# Patient Record
Sex: MALE | Race: WHITE | NOT HISPANIC OR LATINO | Employment: FULL TIME | URBAN - METROPOLITAN AREA
[De-identification: names, ages, dates, MRNs, and addresses within clinical notes are randomized per-mention and may not be internally consistent; named-entity substitution may affect disease eponyms.]

---

## 2023-01-13 ENCOUNTER — OFFICE VISIT (OUTPATIENT)
Dept: URGENT CARE | Facility: CLINIC | Age: 23
End: 2023-01-13
Payer: COMMERCIAL

## 2023-01-13 VITALS
DIASTOLIC BLOOD PRESSURE: 96 MMHG | RESPIRATION RATE: 16 BRPM | WEIGHT: 220 LBS | HEART RATE: 66 BPM | OXYGEN SATURATION: 98 % | TEMPERATURE: 97 F | HEIGHT: 70 IN | SYSTOLIC BLOOD PRESSURE: 160 MMHG | BODY MASS INDEX: 31.5 KG/M2

## 2023-01-13 DIAGNOSIS — Z20.2 EXPOSURE TO STD: Primary | ICD-10-CM

## 2023-01-13 PROCEDURE — 3080F PR MOST RECENT DIASTOLIC BLOOD PRESSURE >= 90 MM HG: ICD-10-PCS | Mod: CPTII,S$GLB,,

## 2023-01-13 PROCEDURE — 1159F PR MEDICATION LIST DOCUMENTED IN MEDICAL RECORD: ICD-10-PCS | Mod: CPTII,S$GLB,,

## 2023-01-13 PROCEDURE — 3077F SYST BP >= 140 MM HG: CPT | Mod: CPTII,S$GLB,,

## 2023-01-13 PROCEDURE — 3080F DIAST BP >= 90 MM HG: CPT | Mod: CPTII,S$GLB,,

## 2023-01-13 PROCEDURE — 3008F BODY MASS INDEX DOCD: CPT | Mod: CPTII,S$GLB,,

## 2023-01-13 PROCEDURE — 96372 THER/PROPH/DIAG INJ SC/IM: CPT | Mod: S$GLB,,, | Performed by: FAMILY MEDICINE

## 2023-01-13 PROCEDURE — 1160F RVW MEDS BY RX/DR IN RCRD: CPT | Mod: CPTII,S$GLB,,

## 2023-01-13 PROCEDURE — 87591 N.GONORRHOEAE DNA AMP PROB: CPT

## 2023-01-13 PROCEDURE — 99203 OFFICE O/P NEW LOW 30 MIN: CPT | Mod: 25,S$GLB,,

## 2023-01-13 PROCEDURE — 1159F MED LIST DOCD IN RCRD: CPT | Mod: CPTII,S$GLB,,

## 2023-01-13 PROCEDURE — 3077F PR MOST RECENT SYSTOLIC BLOOD PRESSURE >= 140 MM HG: ICD-10-PCS | Mod: CPTII,S$GLB,,

## 2023-01-13 PROCEDURE — 3008F PR BODY MASS INDEX (BMI) DOCUMENTED: ICD-10-PCS | Mod: CPTII,S$GLB,,

## 2023-01-13 PROCEDURE — 96372 PR INJECTION,THERAP/PROPH/DIAG2ST, IM OR SUBCUT: ICD-10-PCS | Mod: S$GLB,,, | Performed by: FAMILY MEDICINE

## 2023-01-13 PROCEDURE — 1160F PR REVIEW ALL MEDS BY PRESCRIBER/CLIN PHARMACIST DOCUMENTED: ICD-10-PCS | Mod: CPTII,S$GLB,,

## 2023-01-13 PROCEDURE — 99203 PR OFFICE/OUTPT VISIT, NEW, LEVL III, 30-44 MIN: ICD-10-PCS | Mod: 25,S$GLB,,

## 2023-01-13 RX ORDER — CEFTRIAXONE 500 MG/1
500 INJECTION, POWDER, FOR SOLUTION INTRAMUSCULAR; INTRAVENOUS
Status: COMPLETED | OUTPATIENT
Start: 2023-01-13 | End: 2023-01-13

## 2023-01-13 RX ORDER — DOXYCYCLINE HYCLATE 100 MG
100 TABLET ORAL 2 TIMES DAILY
Qty: 14 TABLET | Refills: 0 | Status: SHIPPED | OUTPATIENT
Start: 2023-01-13 | End: 2023-01-20

## 2023-01-13 RX ADMIN — CEFTRIAXONE 500 MG: 500 INJECTION, POWDER, FOR SOLUTION INTRAMUSCULAR; INTRAVENOUS at 06:01

## 2023-01-13 NOTE — PATIENT INSTRUCTIONS
PLEASE READ YOUR DISCHARGE INSTRUCTIONS ENTIRELY AS IT CONTAINS IMPORTANT INFORMATION.     *We will contact you in 3-5 days with your test results once they return     Increase condom use to prevent further occurance.  Notify sexual partners of the need for testing.  Complete ALL medication prescribed.  NO sexual intercourse for 7 days after treatment. Retest to ensure infection has cleared-there are infections that require more agressive treatment.  Retest for all in 6 weeks and again in 6 months to ensure true negative results.  Today's testing will give no crediable information if you have unprotected sexual activities going forward. Syphillis cases are rising!  Gonorrhea has RESISTANT strains which is why repeat testing after treatment is important.  Gonorrhea may be present in multiple sites from just ONE area of exposure.  For those who have high risk sexual behaviors and are on Truvada for PrEP- you have additional protection against HIV ONLY.  REMEMBER WEAR CONDOMS AND GET TESTED OFTEN.              Do not have sex for 1 week; No unprotected sex for 3 weeks.     Today's testing will give you no credible information if you continue having unprotected sex with same or multiple partners afterwards. All partners should be treated because if not, you will continue passing it back and forth to each other and spreading it to others.     Please return or see your primary care doctor if you develop new or worsening symptoms.      You must understand that you have received an Urgent Care treatment only and that you may be released before all of your medical problems are known or treated.

## 2023-01-13 NOTE — PROGRESS NOTES
"Subjective:       Patient ID: Kevyn Persaud is a 22 y.o. male.    Vitals:  height is 5' 10" (1.778 m) and weight is 99.8 kg (220 lb). His tympanic temperature is 97.1 °F (36.2 °C). His blood pressure is 160/96 (abnormal) and his pulse is 66. His respiration is 16 and oxygen saturation is 98%.     Chief Complaint: Exposure to STD    Pt is a 21 y/o M who presents for STD exposure. Pt states his partner tested positive for chlamydia. He denies any sxs currently, including dysuria, penile discharge, testicular pain or swelling, abdominal pain, urgency, frequency, fever, N/V.    Exposure to STD  The patient's pertinent negatives include no genital injury, genital itching, genital lesions, pelvic pain, penile discharge, penile pain, priapism, scrotal swelling or testicular pain. This is a new problem. The current episode started today. The problem has been unchanged. The patient is experiencing no pain. Pertinent negatives include no abdominal pain, chest pain, chills, constipation, coughing, diarrhea, dysuria, fever, flank pain, frequency, headaches, hematuria, nausea, rash, shortness of breath, sore throat, urgency or vomiting. He has tried nothing for the symptoms. The treatment provided no relief. He is sexually active. It is unknown whether or not his partner has an STD.     Constitution: Negative for chills and fever.   HENT:  Negative for ear pain, congestion and sore throat.    Neck: Negative for neck pain and neck stiffness.   Cardiovascular:  Negative for chest pain.   Respiratory:  Negative for cough and shortness of breath.    Gastrointestinal:  Negative for abdominal pain, nausea, vomiting, constipation and diarrhea.   Genitourinary:  Negative for dysuria, frequency, urgency, flank pain, genital sore, penile discharge, penile pain, penile swelling, scrotal swelling, testicular pain and pelvic pain.   Skin:  Negative for rash.   Neurological:  Negative for dizziness and headaches.     Objective:    "   Physical Exam   Constitutional: He is oriented to person, place, and time. He appears well-developed. No distress.   HENT:   Head: Normocephalic and atraumatic.   Ears:   Right Ear: External ear normal.   Left Ear: External ear normal.   Nose: Nose normal. No nasal deformity. No epistaxis.   Mouth/Throat: Oropharynx is clear and moist and mucous membranes are normal.   Eyes: Conjunctivae and lids are normal.   Neck: Trachea normal and phonation normal. Neck supple.   Cardiovascular: Normal rate, regular rhythm, normal heart sounds and normal pulses.   Pulmonary/Chest: Effort normal and breath sounds normal.   Abdominal: Normal appearance and bowel sounds are normal. He exhibits no distension. Soft. There is no abdominal tenderness. There is no rebound and no guarding.   Genitourinary:         Comments: Deferred     Musculoskeletal: Normal range of motion.         General: Normal range of motion.   Neurological: He is alert and oriented to person, place, and time. He has normal reflexes.   Skin: Skin is warm, dry, intact and not diaphoretic.   Psychiatric: His speech is normal and behavior is normal. Judgment and thought content normal.   Nursing note and vitals reviewed.      Assessment:       1. Exposure to STD          Plan:         Exposure to STD  -     Cancel: POCT Urinalysis, Dipstick, Automated, W/O Scope  -     C. trachomatis/N. gonorrhoeae by AMP DNA [facility]; Urine  -     cefTRIAXone injection 500 mg  -     doxycycline (VIBRA-TABS) 100 MG tablet; Take 1 tablet (100 mg total) by mouth 2 (two) times daily. for 7 days  Dispense: 14 tablet; Refill: 0                   Patient Instructions   PLEASE READ YOUR DISCHARGE INSTRUCTIONS ENTIRELY AS IT CONTAINS IMPORTANT INFORMATION.     *We will contact you in 3-5 days with your test results once they return     Increase condom use to prevent further occurance.  Notify sexual partners of the need for testing.  Complete ALL medication prescribed.  NO sexual  intercourse for 7 days after treatment. Retest to ensure infection has cleared-there are infections that require more agressive treatment.  Retest for all in 6 weeks and again in 6 months to ensure true negative results.  Today's testing will give no crediable information if you have unprotected sexual activities going forward. Syphillis cases are rising!  Gonorrhea has RESISTANT strains which is why repeat testing after treatment is important.  Gonorrhea may be present in multiple sites from just ONE area of exposure.  For those who have high risk sexual behaviors and are on Truvada for PrEP- you have additional protection against HIV ONLY.  REMEMBER WEAR CONDOMS AND GET TESTED OFTEN.              Do not have sex for 1 week; No unprotected sex for 3 weeks.     Today's testing will give you no credible information if you continue having unprotected sex with same or multiple partners afterwards. All partners should be treated because if not, you will continue passing it back and forth to each other and spreading it to others.     Please return or see your primary care doctor if you develop new or worsening symptoms.      You must understand that you have received an Urgent Care treatment only and that you may be released before all of your medical problems are known or treated.

## 2023-01-18 LAB
C TRACH DNA SPEC QL NAA+PROBE: NOT DETECTED
N GONORRHOEA DNA SPEC QL NAA+PROBE: NOT DETECTED

## 2023-01-19 ENCOUNTER — TELEPHONE (OUTPATIENT)
Dept: URGENT CARE | Facility: CLINIC | Age: 23
End: 2023-01-19
Payer: COMMERCIAL

## 2023-01-19 NOTE — TELEPHONE ENCOUNTER
Results for orders placed or performed in visit on 23   C. trachomatis/N. gonorrhoeae by [facility]; Urine    Specimen: Genital   Result Value Ref Range    Chlamydia, Amplified DNA Not Detected Not Detected    N gonorrhoeae, amplified DNA Not Detected Not Detected      Called and reviewed test results with pt he verified full name and . Advised neg chlamydia and gonorrhea. He verbalized understanding, denies complaints, I advised for him to fu with any future questions or concerns, he agreed with plan.